# Patient Record
Sex: MALE | Race: OTHER | URBAN - METROPOLITAN AREA
[De-identification: names, ages, dates, MRNs, and addresses within clinical notes are randomized per-mention and may not be internally consistent; named-entity substitution may affect disease eponyms.]

---

## 2023-04-24 ENCOUNTER — OFFICE VISIT (OUTPATIENT)
Age: 48
End: 2023-04-24

## 2023-04-24 VITALS — HEIGHT: 72 IN | WEIGHT: 215 LBS | BODY MASS INDEX: 29.12 KG/M2

## 2023-04-24 DIAGNOSIS — M25.511 RIGHT SHOULDER PAIN, UNSPECIFIED CHRONICITY: Primary | ICD-10-CM

## 2023-04-24 RX ORDER — LISINOPRIL 20 MG/1
20 TABLET ORAL DAILY
COMMUNITY

## 2023-04-24 NOTE — PROGRESS NOTES
Name: Kulwinder Sim    : 1975     [facility] [address] [place] [address] AND SPORTS MEDICINE  [address] [place] [address] [address] [address]  Dept: [phone]  Dept Fax: [phone]     Chief Complaint   Patient presents with    Shoulder Pain        Ht 6' (1.829 m)   Wt 215 lb (97.5 kg)   BMI 29.16 kg/m²      No Known Allergies     Current Outpatient Medications   Medication Sig Dispense Refill    diclofenac sodium (VOLTAREN) 1 % GEL Apply topically      lisinopril (PRINIVIL;ZESTRIL) 20 MG tablet Take 1 tablet by mouth daily       No current facility-administered medications for this visit. There is no problem list on file for this patient. Family History   Problem Relation Age of Onset    No Known Problems Mother     No Known Problems Father       Social History     Socioeconomic History    Marital status:      Spouse name: None    Number of children: None    Years of education: None    Highest education level: None   Tobacco Use    Smoking status: Never    Smokeless tobacco: Never   Substance and Sexual Activity    Alcohol use: Never      Past Surgical History:   Procedure Laterality Date    BACK SURGERY        Past Medical History:   Diagnosis Date    Hypertension         I have reviewed and agree with [address] [place] [address] Nw and ROS and intake form in chart and the record furthermore I have reviewed prior medical record(s) regarding this patients care during this appointment. Review of Systems:   Patient is a pleasant appearing individual, appropriately dressed, well hydrated, well nourished, who is alert, appropriately oriented for age, and in no acute distress with a normal gait and normal affect who does not appear to be in any significant pain. Physical Exam:  Left Shoulder - Grossly neurovascularly intact. Range of motion-Full passive, Active with impingement.  Anterior apprehension, No Point tenderness, Strength-weakness with abduction, palpable

## 2023-04-24 NOTE — PATIENT INSTRUCTIONS
the chest to the neck, jaw, or one or both shoulders or arms. Dizziness or lightheadedness. A fast or uneven pulse. After calling 911, chew 1 adult-strength aspirin. Wait for an ambulance. Do not try to drive yourself. Your arm or hand is cool or pale or changes color. Call your doctor now or seek immediate medical care if:    You have signs of infection, such as: Increased pain, swelling, warmth, or redness in your shoulder. Red streaks leading from a place on your shoulder. Pus draining from an area of your shoulder. Swollen lymph nodes in your neck, armpits, or groin. A fever. Watch closely for changes in your health, and be sure to contact your doctor if:    You cannot use your shoulder. Your shoulder does not get better as expected. Where can you learn more? Go to http://www.woods.com/ and enter H996 to learn more about \"Shoulder Pain: Care Instructions. \"  Current as of: March 9, 2022               Content Version: 13.5  © 2006-2022 [employer]wise[employer]. Care instructions adapted under license by [facility] ([facility]). If you have questions about a medical condition or this instruction, always ask your healthcare professional. [place] [address] any warranty or liability for your use of this information.

## 2023-05-19 DIAGNOSIS — M25.511 RIGHT SHOULDER PAIN, UNSPECIFIED CHRONICITY: ICD-10-CM

## 2023-05-25 ENCOUNTER — HOSPITAL ENCOUNTER (OUTPATIENT)
Age: 48
End: 2023-05-25
Payer: COMMERCIAL

## 2023-05-25 ENCOUNTER — OFFICE VISIT (OUTPATIENT)
Age: 48
End: 2023-05-25

## 2023-05-25 ENCOUNTER — HOSPITAL ENCOUNTER (OUTPATIENT)
Age: 48
Discharge: HOME | End: 2023-05-25
Payer: COMMERCIAL

## 2023-05-25 DIAGNOSIS — S43.431A SUPERIOR GLENOID LABRUM LESION OF RIGHT SHOULDER, INITIAL ENCOUNTER: ICD-10-CM

## 2023-05-25 DIAGNOSIS — M25.511 RIGHT SHOULDER PAIN, UNSPECIFIED CHRONICITY: ICD-10-CM

## 2023-05-25 DIAGNOSIS — M75.111 INCOMPLETE ROTATOR CUFF TEAR OR RUPTURE OF RIGHT SHOULDER, NOT SPECIFIED AS TRAUMATIC: ICD-10-CM

## 2023-05-25 DIAGNOSIS — M25.511 RIGHT SHOULDER PAIN, UNSPECIFIED CHRONICITY: Primary | ICD-10-CM

## 2023-05-25 LAB
ANION GAP SERPL CALC-SCNC: 4 MMOL/L (ref 3–18)
BUN SERPL-MCNC: 20 MG/DL (ref 7–18)
BUN/CREAT SERPL: 28 (ref 12–20)
CA-I BLD-MCNC: 8.7 MG/DL (ref 8.5–10.1)
CHLORIDE SERPL-SCNC: 106 MMOL/L (ref 100–111)
CO2 SERPL-SCNC: 29 MMOL/L (ref 21–32)
CREAT SERPL-MCNC: 0.72 MG/DL (ref 0.6–1.3)
EKG ATRIAL RATE: 58 BPM
EKG DIAGNOSIS: NORMAL
EKG P AXIS: 46 DEGREES
EKG P-R INTERVAL: 160 MS
EKG Q-T INTERVAL: 400 MS
EKG QRS DURATION: 100 MS
EKG QTC CALCULATION (BAZETT): 392 MS
EKG R AXIS: 57 DEGREES
EKG T AXIS: 32 DEGREES
EKG VENTRICULAR RATE: 58 BPM
ERYTHROCYTE [DISTWIDTH] IN BLOOD BY AUTOMATED COUNT: 13.2 % (ref 11.6–14.5)
GLUCOSE SERPL-MCNC: 88 MG/DL (ref 74–99)
HCT VFR BLD AUTO: 43.1 % (ref 36–48)
HGB BLD-MCNC: 14.8 G/DL (ref 13–16)
MCH RBC QN AUTO: 30.9 PG (ref 24–34)
MCHC RBC AUTO-ENTMCNC: 34.3 G/DL (ref 31–37)
MCV RBC AUTO: 90 FL (ref 78–100)
NRBC # BLD: 0 K/UL (ref 0–0.01)
NRBC BLD-RTO: 0 PER 100 WBC
PLATELET # BLD AUTO: 186 K/UL (ref 135–420)
PMV BLD AUTO: 12.1 FL (ref 9.2–11.8)
POTASSIUM SERPL-SCNC: 3.6 MMOL/L (ref 3.5–5.5)
RBC # BLD AUTO: 4.79 M/UL (ref 4.35–5.65)
SODIUM SERPL-SCNC: 139 MMOL/L (ref 136–145)
WBC # BLD AUTO: 5.2 K/UL (ref 4.6–13.2)

## 2023-05-25 PROCEDURE — 36415 COLL VENOUS BLD VENIPUNCTURE: CPT

## 2023-05-25 PROCEDURE — 93005 ELECTROCARDIOGRAM TRACING: CPT

## 2023-05-25 PROCEDURE — 80048 BASIC METABOLIC PNL TOTAL CA: CPT

## 2023-05-25 PROCEDURE — 85027 COMPLETE CBC AUTOMATED: CPT

## 2023-06-01 RX ORDER — ACETAMINOPHEN 500 MG
1000 TABLET ORAL ONCE
Status: CANCELLED | OUTPATIENT
Start: 2023-06-01 | End: 2023-06-01

## 2023-06-01 RX ORDER — GABAPENTIN 300 MG/1
300 CAPSULE ORAL ONCE
Status: CANCELLED | OUTPATIENT
Start: 2023-06-01 | End: 2023-06-01

## 2023-06-12 NOTE — H&P (VIEW-ONLY)
utilize Tylenol or OTC NSAIDS as long as it is not medically contraindicated. Return to Office: Follow-up and Dispositions    Return for ALREADY SCHEDULED FOR SURGERY. Scribed by Amberly Andrade LPN as dictated by [facility] [place] RAJENDRA Witt MD.  Documentation, performed by, True and Accepted Milton Witt MD

## 2023-06-15 ENCOUNTER — HOSPITAL ENCOUNTER (OUTPATIENT)
Age: 48
Setting detail: OUTPATIENT SURGERY
Discharge: HOME | End: 2023-06-15
Attending: ORTHOPAEDIC SURGERY | Admitting: ORTHOPAEDIC SURGERY
Payer: COMMERCIAL

## 2023-06-15 VITALS
HEART RATE: 68 BPM | RESPIRATION RATE: 16 BRPM | HEIGHT: 72 IN | TEMPERATURE: 98.1 F | BODY MASS INDEX: 29.16 KG/M2 | DIASTOLIC BLOOD PRESSURE: 76 MMHG | OXYGEN SATURATION: 97 % | SYSTOLIC BLOOD PRESSURE: 127 MMHG

## 2023-06-15 PROCEDURE — 3600000002 HC SURGERY LEVEL 2 BASE: Performed by: ORTHOPAEDIC SURGERY

## 2023-06-15 PROCEDURE — 3700000001 HC ADD 15 MINUTES (ANESTHESIA): Performed by: ORTHOPAEDIC SURGERY

## 2023-06-15 PROCEDURE — 2709999900 HC NON-CHARGEABLE SUPPLY: Performed by: ORTHOPAEDIC SURGERY

## 2023-06-15 PROCEDURE — 7100000001 HC PACU RECOVERY - ADDTL 15 MIN: Performed by: ORTHOPAEDIC SURGERY

## 2023-06-15 PROCEDURE — 7100000011 HC PHASE II RECOVERY - ADDTL 15 MIN: Performed by: ORTHOPAEDIC SURGERY

## 2023-06-15 PROCEDURE — 7100000000 HC PACU RECOVERY - FIRST 15 MIN: Performed by: ORTHOPAEDIC SURGERY

## 2023-06-15 PROCEDURE — 3600000012 HC SURGERY LEVEL 2 ADDTL 15MIN: Performed by: ORTHOPAEDIC SURGERY

## 2023-06-15 PROCEDURE — 2720000010 HC SURG SUPPLY STERILE: Performed by: ORTHOPAEDIC SURGERY

## 2023-06-15 PROCEDURE — C1713 ANCHOR/SCREW BN/BN,TIS/BN: HCPCS | Performed by: ORTHOPAEDIC SURGERY

## 2023-06-15 PROCEDURE — 3700000000 HC ANESTHESIA ATTENDED CARE: Performed by: ORTHOPAEDIC SURGERY

## 2023-06-15 PROCEDURE — 6360000002 HC RX W HCPCS: Performed by: ORTHOPAEDIC SURGERY

## 2023-06-15 PROCEDURE — 2500000003 HC RX 250 WO HCPCS: Performed by: ORTHOPAEDIC SURGERY

## 2023-06-15 PROCEDURE — 7100000010 HC PHASE II RECOVERY - FIRST 15 MIN: Performed by: ORTHOPAEDIC SURGERY

## 2023-06-15 PROCEDURE — 2580000003 HC RX 258: Performed by: NURSE PRACTITIONER

## 2023-06-15 DEVICE — LUPINE BR ANCHOR W/DS ORTHOCORD TCP/PLGA ABSORBABLE ANCHOR, WITH DUAL SIZE 2 (5 METRIC) BRAIDED COMPOSITE SUTURE, VIOLET AND BLUE, 36 INCHES (91CM). SIZE 2 (5 METRIC) EYELET LOOP.
Type: IMPLANTABLE DEVICE | Site: SHOULDER | Status: FUNCTIONAL
Brand: LUPINE ORTHOCORD

## 2023-06-15 RX ORDER — OXYCODONE HYDROCHLORIDE AND ACETAMINOPHEN 5; 325 MG/1; MG/1
1 TABLET ORAL EVERY 4 HOURS PRN
Status: DISCONTINUED | OUTPATIENT
Start: 2023-06-15 | End: 2023-06-15 | Stop reason: HOSPADM

## 2023-06-15 RX ORDER — SODIUM CHLORIDE, SODIUM LACTATE, POTASSIUM CHLORIDE, CALCIUM CHLORIDE 600; 310; 30; 20 MG/100ML; MG/100ML; MG/100ML; MG/100ML
INJECTION, SOLUTION INTRAVENOUS CONTINUOUS
Status: DISCONTINUED | OUTPATIENT
Start: 2023-06-15 | End: 2023-06-15 | Stop reason: HOSPADM

## 2023-06-15 RX ORDER — SODIUM CHLORIDE 0.9 % (FLUSH) 0.9 %
5-40 SYRINGE (ML) INJECTION EVERY 12 HOURS SCHEDULED
Status: DISCONTINUED | OUTPATIENT
Start: 2023-06-15 | End: 2023-06-15 | Stop reason: HOSPADM

## 2023-06-15 RX ORDER — SODIUM CHLORIDE 9 MG/ML
INJECTION, SOLUTION INTRAVENOUS PRN
Status: DISCONTINUED | OUTPATIENT
Start: 2023-06-15 | End: 2023-06-15 | Stop reason: HOSPADM

## 2023-06-15 RX ORDER — ONDANSETRON 2 MG/ML
4 INJECTION INTRAMUSCULAR; INTRAVENOUS
Status: DISCONTINUED | OUTPATIENT
Start: 2023-06-15 | End: 2023-06-15 | Stop reason: HOSPADM

## 2023-06-15 RX ORDER — FENTANYL CITRATE 50 UG/ML
50 INJECTION, SOLUTION INTRAMUSCULAR; INTRAVENOUS EVERY 5 MIN PRN
Status: DISCONTINUED | OUTPATIENT
Start: 2023-06-15 | End: 2023-06-15 | Stop reason: HOSPADM

## 2023-06-15 RX ORDER — CELECOXIB 200 MG/1
400 CAPSULE ORAL ONCE
Status: DISCONTINUED | OUTPATIENT
Start: 2023-06-15 | End: 2023-06-15

## 2023-06-15 RX ORDER — BUPIVACAINE HYDROCHLORIDE AND EPINEPHRINE 2.5; 5 MG/ML; UG/ML
INJECTION, SOLUTION INFILTRATION; PERINEURAL PRN
Status: DISCONTINUED | OUTPATIENT
Start: 2023-06-15 | End: 2023-06-15 | Stop reason: ALTCHOICE

## 2023-06-15 RX ORDER — SODIUM CHLORIDE 0.9 % (FLUSH) 0.9 %
5-40 SYRINGE (ML) INJECTION PRN
Status: DISCONTINUED | OUTPATIENT
Start: 2023-06-15 | End: 2023-06-15 | Stop reason: HOSPADM

## 2023-06-15 RX ADMIN — SODIUM CHLORIDE, POTASSIUM CHLORIDE, SODIUM LACTATE AND CALCIUM CHLORIDE: 600; 310; 30; 20 INJECTION, SOLUTION INTRAVENOUS at 08:49

## 2023-06-15 ASSESSMENT — PAIN SCALES - GENERAL
PAINLEVEL_OUTOF10: 0

## 2023-06-15 ASSESSMENT — PAIN - FUNCTIONAL ASSESSMENT: PAIN_FUNCTIONAL_ASSESSMENT: NONE - DENIES PAIN

## 2023-06-15 ASSESSMENT — PAIN SCALES - WONG BAKER
WONGBAKER_NUMERICALRESPONSE: 0

## 2023-06-15 NOTE — INTERVAL H&P NOTE
Update History & Physical    The Patient's History and Physical was reviewed with the patient. The patient was examined. There was no change. The surgical site was confirmed by the patient and me. Patient understands and wants to proceed with the procedure. If applicable, I have discussed with the patient / power of  the rationale for blood component transfusion; its benefits in treating or preventing fatigue, organ damage, or death; and its risk which includes mild transfusion reactions, rare risk of blood borne infection, or more serious but rare reactions. I have discussed the alternatives to transfusion, including the risk and consequences of not receiving transfusion. The patient / [place] of  had an opportunity to ask questions and had agreed to proceed with transfusion of blood components. Plan:  The risk, benefits, expected outcome, and alternative to the recommended procedure have been discussed with the patient.

## 2023-06-15 NOTE — DISCHARGE INSTRUCTIONS
Arthroscopic Surgery of the Shoulder #2 Post-op    1. Your first home meal should be clear liquids. 2.  An ice pack should be applied to the shoulder for at least 20 minutes 4 times a day or more often in the first 72 hours. Do not use heat. This may increase swelling and discomfort. 3.  Exercises are not necessary at this stage. You will be instructed on exercises during your visit to the office next week. 4.  A shoulder immobilizer is provided for healing. You may remove it for showers. Keep arm in the same position as if in the immobilizer. Wear immobilizer at all other times, including while asleep. 5.  Your shoulder will be immobilized until your follow up appt. 6.  A prescription will be provided for pain before you are discharged. Do not take any aspirin for 1 week after surgery. Use plain Tylenol instead. Please inform the office of any known drug allergy. 7.  The dressing may be removed after 48 hours. Cover the incision with Band-Aids. 8.  You may shower 48 hours after surgery. Carefully dry the incisions and change Band-Aids daily after shower. 9.  You are not cleared to drive while your shoulder is immobilized. 10.  Activities:  A. let pain be her guide to activity, too much pain equals too much activity  B. Some amount of swelling may be present postoperatively. Restrict activity if swelling is present. C.  Do not use exercise machines unless specifically instructed to. D.  Generally, if you have a job with little physical activity may return to work on the third postoperative day. E.  If your job requires excessive lifting or the use of your arm then discuss her return to work date with your provider. If you need refill of pain medication, call the office 2 business days prior to running out of medication. Please call during regular business hours, Medication refill requests will not be addressed during non-business hours.  Please do not page the

## 2023-06-15 NOTE — OP NOTE
Operative Note    Patient: Allan Garnica MRN: [id number]  Surgery Date: [unfilled]  [unfilled]          Procedure  Primary Surgeon    RIGHT SHOULDER ARTHROSCOPY/ D/ M/ SSB/ POSS RTC REP/ EXT AMOS/ POSS SLAP REP  Jeanna Cantu MD    * Panel 2 does not exist *  * Panel 2 does not exist *    * Panel 3 does not exist *  * Panel 3 does not exist *     Surgeon(s) and Role:     * Jeanna Cantu MD - Primary    Other OR Staff/Assistants:  Circulator: Milagros Luke RN  Surgical Assistant: Karen Solorio  Scrub Person First: Wilner De Anda RN  Charge Nurse: Johnathan Garcia RN    1st Assistant Tasks:  Closing    Pre-operative Diagnosis:  Complete tear of right rotator cuff, unspecified whether traumatic [M75.121]  Articular cartilage disorder of shoulder, 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]  Arthritis of right shoulder region [M19.011]  Acute pain of right shoulder [M25.511]  Superior glenoid labrum lesion of left shoulder, subsequent encounter [S43.517D]    Post-operative Diagnosis: same as preop diagnosis    Anesthesia Type: General     Findings: Labral tear    Complications: No    EBL: 25CC    Implants:   Implants       Type Not Specified    [employer] Sut For Arthscp Instability Rep Rue Covert - [id number] - Implanted   (Right) Shoulder      Inventory item: ANCHOR SUT FOR ARTHSCP INSTABILITY  Doron Hummel Model/Cat number: [id number]    : [employer] Lot number: [id number]    Device identifier: [id number] Device identifier type: GS1      GUDID Information       Request status Successful        Brand name: Ally Doll Version/Model: [address] [place] [address] Street name: [facility] MRI safety info as of 6/15/23: Labeling does not contain MRI Safety Information    Contains dry or latex rubber: No      GMDN P.T. name: Tendon/ligament bone anchor, bioabsorbable                As of 6/15/2023       Status: Implanted                              Specimens: None    Operative procedure: Labral repair, decompression,

## 2023-06-22 ENCOUNTER — TELEPHONE (OUTPATIENT)
Age: 48
End: 2023-06-22

## 2023-06-22 DIAGNOSIS — Z98.890 S/P RIGHT ROTATOR CUFF REPAIR: Primary | ICD-10-CM

## 2023-06-22 RX ORDER — OXYCODONE HYDROCHLORIDE AND ACETAMINOPHEN 5; 325 MG/1; MG/1
1 TABLET ORAL EVERY 6 HOURS PRN
Qty: 30 TABLET | Refills: 0 | Status: SHIPPED | OUTPATIENT
Start: 2023-06-22 | End: 2023-06-30

## 2023-06-22 NOTE — TELEPHONE ENCOUNTER
Patient had a rt shoulder scope on 6/15/23     He is requesting a refill on his [address], [place]

## 2023-06-29 ENCOUNTER — OFFICE VISIT (OUTPATIENT)
Age: 48
End: 2023-06-29

## 2023-06-29 VITALS — HEIGHT: 72 IN | BODY MASS INDEX: 29.12 KG/M2 | WEIGHT: 215 LBS

## 2023-06-29 DIAGNOSIS — Z98.890 STATUS POST LABRAL REPAIR OF SHOULDER: Primary | ICD-10-CM

## 2023-06-29 DIAGNOSIS — Z98.890 HISTORY OF ARTHROSCOPIC SURGERY OF SHOULDER: ICD-10-CM

## 2023-06-29 PROCEDURE — 99024 POSTOP FOLLOW-UP VISIT: CPT | Performed by: NURSE PRACTITIONER

## 2023-06-29 RX ORDER — OXYCODONE HYDROCHLORIDE AND ACETAMINOPHEN 5; 325 MG/1; MG/1
1 TABLET ORAL EVERY 6 HOURS PRN
Qty: 30 TABLET | Refills: 0 | Status: SHIPPED | OUTPATIENT
Start: 2023-06-29 | End: 2023-07-07

## 2023-07-10 ENCOUNTER — TELEPHONE (OUTPATIENT)
Age: 48
End: 2023-07-10

## 2023-07-10 DIAGNOSIS — G89.18 ACUTE POSTOPERATIVE PAIN: Primary | ICD-10-CM

## 2023-07-10 RX ORDER — OXYCODONE HYDROCHLORIDE AND ACETAMINOPHEN 5; 325 MG/1; MG/1
1 TABLET ORAL EVERY 8 HOURS PRN
Qty: 21 TABLET | Refills: 0 | Status: SHIPPED | OUTPATIENT
Start: 2023-07-10 | End: 2023-07-17

## 2023-08-03 ENCOUNTER — OFFICE VISIT (OUTPATIENT)
Age: 48
End: 2023-08-03

## 2023-08-03 DIAGNOSIS — Z98.890 STATUS POST LABRAL REPAIR OF SHOULDER: Primary | ICD-10-CM

## 2023-08-03 NOTE — PROGRESS NOTES
Name: Musa Davis    : 1975     [facility] AND SPORTS MEDICINE  [address] [place] [address], [address] [place] [address] [address] [address]  Dept: [phone]  Dept Fax: [phone]     Chief Complaint   Patient presents with    Shoulder Pain    Post-Op Check        There were no vitals taken for this visit. No Known Allergies     Current Outpatient Medications   Medication Sig Dispense Refill    diclofenac sodium (VOLTAREN) 1 % GEL Apply topically      lisinopril (PRINIVIL;ZESTRIL) 20 MG tablet Take 1 tablet by mouth daily       No current facility-administered medications for this visit. There is no problem list on file for this patient. Family History   Problem Relation Age of Onset    No Known Problems Mother     No Known Problems Father       Social History     Socioeconomic History    Marital status:      Spouse name: None    Number of children: None    Years of education: None    Highest education level: None   Tobacco Use    Smoking status: Never    Smokeless tobacco: Never   Substance and Sexual Activity    Alcohol use: Never      Past Surgical History:   Procedure Laterality Date    BACK SURGERY      SHOULDER ARTHROSCOPY Right 6/15/2023    RIGHT SHOULDER ARTHROSCOPY/ Decompression/ Yonis/ SSB/ EXT AMOS/ SLAP REP performed by Miladys Rosas MD at [facility] MAIN OR      Past Medical History:   Diagnosis Date    Hypertension         I have reviewed and agree with [address] and ROS and intake form in chart and the record furthermore I have reviewed prior medical record(s) regarding this patients care during this appointment. Review of Systems:   Patient is a pleasant appearing individual, appropriately dressed, well hydrated, well nourished, who is alert, appropriately oriented for age, and in no acute distress with a normal gait and normal affect who does not appear to be in any significant pain.     Physical Exam:  Right Shoulder - Grossly

## 2023-08-31 ENCOUNTER — OFFICE VISIT (OUTPATIENT)
Age: 48
End: 2023-08-31

## 2023-08-31 VITALS — HEIGHT: 72 IN | BODY MASS INDEX: 29.12 KG/M2 | WEIGHT: 215 LBS

## 2023-08-31 DIAGNOSIS — Z98.890 STATUS POST LABRAL REPAIR OF SHOULDER: Primary | ICD-10-CM

## 2023-08-31 PROCEDURE — 99024 POSTOP FOLLOW-UP VISIT: CPT

## 2023-08-31 RX ORDER — GABAPENTIN 100 MG/1
100 CAPSULE ORAL 3 TIMES DAILY
Qty: 90 CAPSULE | Refills: 0 | Status: SHIPPED | OUTPATIENT
Start: 2023-08-31 | End: 2023-09-30

## 2023-09-27 ENCOUNTER — OFFICE VISIT (OUTPATIENT)
Age: 48
End: 2023-09-27

## 2023-09-27 DIAGNOSIS — Z98.890 STATUS POST LABRAL REPAIR OF SHOULDER: Primary | ICD-10-CM

## 2023-09-27 PROCEDURE — 99024 POSTOP FOLLOW-UP VISIT: CPT

## 2023-09-27 RX ORDER — METHYLPREDNISOLONE 4 MG/1
TABLET ORAL
Qty: 1 KIT | Refills: 0 | Status: SHIPPED | OUTPATIENT
Start: 2023-09-27

## 2023-09-27 RX ORDER — TRAMADOL HYDROCHLORIDE 50 MG/1
50 TABLET ORAL EVERY 6 HOURS PRN
Qty: 30 TABLET | Refills: 0 | Status: SHIPPED | OUTPATIENT
Start: 2023-09-27 | End: 2023-10-05

## 2023-10-23 ENCOUNTER — PATIENT MESSAGE (OUTPATIENT)
Age: 48
End: 2023-10-23

## 2023-10-23 RX ORDER — METHYLPREDNISOLONE 4 MG/1
TABLET ORAL
Qty: 1 KIT | Refills: 0 | Status: SHIPPED | OUTPATIENT
Start: 2023-10-23

## 2023-10-23 NOTE — TELEPHONE ENCOUNTER
From: Kwadwo Kelsey  To: Christie Newberry Hash  Sent: 10/23/2023 9:44 AM EDT  Subject: Methylprednisolone    Good morning, I need a methylprednisolone refill, could you help me with that.

## 2023-11-09 ENCOUNTER — OFFICE VISIT (OUTPATIENT)
Age: 48
End: 2023-11-09

## 2023-11-09 DIAGNOSIS — M25.511 RIGHT SHOULDER PAIN, UNSPECIFIED CHRONICITY: Primary | ICD-10-CM

## 2023-11-09 PROCEDURE — 99212 OFFICE O/P EST SF 10 MIN: CPT | Performed by: ORTHOPAEDIC SURGERY

## 2023-11-09 NOTE — PROGRESS NOTES
instabilty is noted, No apprehension. No Swelling. Left Shoulder - Grossly neurovascularly intact, Full Range of motion, No point tenderness, No weakness, No skin lesions, No Instability, No apprehension, No swelling. Visit Diagnoses         Codes    Right shoulder pain, unspecified chronicity    -  Primary M25.511               HPI:  The patient is here status post right shoulder arthroscopy, decompression Yonis and SLAP repair, doing well. Pain is 5/10. Assessment/Plan:  Plan at this point, my recommendation will be for ice, elevate, antiinflammatories. He is going to go to stage III right shoulder. His surgery was on 06/15/2023 and we will see him back after FCE and we will go from there. As part of continued conservative pain management options the patient was advised to utilize Tylenol or OTC NSAIDS as long as it is not medically contraindicated. Return to Office: Follow-up and Dispositions    Return if symptoms worsen or fail to improve. Scribed by Yesenia Whiting LPN as dictated by [facility] [place] RAJENDRA Matthews MD.  Documentation, performed by, True and Accepted Milton Matthews MD

## 2024-02-26 ENCOUNTER — OFFICE VISIT (OUTPATIENT)
Age: 49
End: 2024-02-26

## 2024-02-26 DIAGNOSIS — M25.511 RIGHT SHOULDER PAIN, UNSPECIFIED CHRONICITY: Primary | ICD-10-CM

## 2024-02-26 NOTE — PROGRESS NOTES
Name: Gutierrez Rojo    : 1975     [facility] ORTHOPAEDICS AND SPORTS MEDICINE  [address], [address]  [facility] [address]  Dept: [phone]  Dept Fax: [phone]     Chief Complaint   Patient presents with    Shoulder Pain        There were no vitals taken for this visit.     No Known Allergies     Current Outpatient Medications   Medication Sig Dispense Refill    methylPREDNISolone (MEDROL DOSEPACK) 4 MG tablet Take by mouth Per Dose pack instructions 1 kit 0    gabapentin (NEURONTIN) 100 MG capsule Take 1 capsule by mouth 3 times daily for 30 days. Intended supply: 30 days Max Daily Amount: 300 mg 90 capsule 0     No current facility-administered medications for this visit.      There is no problem list on file for this patient.     Family History   Problem Relation Age of Onset    No Known Problems Mother     No Known Problems Father        Past Surgical History:   Procedure Laterality Date    BACK SURGERY      SHOULDER ARTHROSCOPY Right 6/15/2023    RIGHT SHOULDER ARTHROSCOPY/ Decompression/ Yonis/ SSB/ EXT AMOS/ SLAP REP performed by Milton Motley MD at [facility] MAIN OR      Past Medical History:   Diagnosis Date    Hypertension         I have reviewed and agree with PFSH and ROS and intake form in chart and the record furthermore I have reviewed prior medical record(s) regarding this patients care during this appointment.     Review of Systems:   Patient is a pleasant appearing individual, appropriately dressed, well hydrated, well nourished, who is alert, appropriately oriented for age, and in no acute distress with a normal gait and normal affect who does not appear to be in any significant pain.     Physical Exam:  Right Shoulder - Grossly neurovascularly intact. Range of motion-Full passive, Active with impingement, Anterior apprehension, No Point tenderness, Strength-weakness with abduction, palpable popping felt on exam, No skin

## 2024-02-26 NOTE — PATIENT INSTRUCTIONS
the chest to the neck, jaw, or one or both shoulders or arms.  Dizziness or lightheadedness.  A fast or uneven pulse.  After calling 911, chew 1 adult-strength aspirin. Wait for an ambulance. Do not try to drive yourself.     Your arm or hand is cool or pale or changes color.   Call your doctor now or seek immediate medical care if:    You have signs of infection, such as:  Increased pain, swelling, warmth, or redness in your shoulder.  Red streaks leading from a place on your shoulder.  Pus draining from an area of your shoulder.  Swollen lymph nodes in your neck, armpits, or groin.  A fever.   Watch closely for changes in your health, and be sure to contact your doctor if:    You cannot use your shoulder.     Your shoulder does not get better as expected.   Where can you learn more?  Go to https://www.[employer].net/patientEd and enter H996 to learn more about \"Shoulder Pain: Care Instructions.\"  Current as of: March 9, 2022               Content Version: 13.5  © 2006-2022 [employer].   Care instructions adapted under license by [employer]. If you have questions about a medical condition or this instruction, always ask your healthcare professional. [employer] disclaims any warranty or liability for your use of this information.

## 2024-06-04 DIAGNOSIS — M25.511 RIGHT SHOULDER PAIN, UNSPECIFIED CHRONICITY: ICD-10-CM

## 2024-06-13 ENCOUNTER — OFFICE VISIT (OUTPATIENT)
Age: 49
End: 2024-06-13

## 2024-06-13 DIAGNOSIS — M25.511 RIGHT SHOULDER PAIN, UNSPECIFIED CHRONICITY: Primary | ICD-10-CM

## 2024-06-13 NOTE — PROGRESS NOTES
Name: Gutierrez Lopez    : 1975     [facility] ORTHOPAEDICS AND SPORTS MEDICINE  [address], [address]  [facility] [address]  Dept: [phone]  Dept Fax: [phone]     Chief Complaint   Patient presents with    Shoulder Pain     Right - post MRI        There were no vitals taken for this visit.     No Known Allergies     Current Outpatient Medications   Medication Sig Dispense Refill    finasteride (PROSCAR) 5 MG tablet Take 1 tablet by mouth daily 90 tablet 0    lisinopril (PRINIVIL;ZESTRIL) 5 MG tablet Take 1 tablet by mouth daily      tamsulosin (FLOMAX) 0.4 MG capsule Take 1 capsule by mouth daily 90 capsule 1    methylPREDNISolone (MEDROL DOSEPACK) 4 MG tablet Take by mouth Per Dose pack instructions (Patient not taking: Reported on 2024) 1 kit 0    gabapentin (NEURONTIN) 100 MG capsule Take 1 capsule by mouth 3 times daily for 30 days. Intended supply: 30 days Max Daily Amount: 300 mg 90 capsule 0     No current facility-administered medications for this visit.      There is no problem list on file for this patient.     Family History   Problem Relation Age of Onset    No Known Problems Mother     No Known Problems Father        Past Surgical History:   Procedure Laterality Date    BACK SURGERY      SHOULDER ARTHROSCOPY Right 6/15/2023    RIGHT SHOULDER ARTHROSCOPY/ Decompression/ Yonis/ SSB/ EXT AMOS/ SLAP REP performed by Milton Motley MD at [facility] MAIN OR      Past Medical History:   Diagnosis Date    Hypertension         I have reviewed and agree with [facility] and intake form in chart and the record furthermore I have reviewed prior medical record(s) regarding this patients care during this appointment.     Review of Systems:   Patient is a pleasant appearing individual, appropriately dressed, well hydrated, well nourished, who is alert, appropriately oriented for age, and in no acute distress with a normal gait and

## 2024-07-12 ENCOUNTER — PATIENT MESSAGE (OUTPATIENT)
Age: 49
End: 2024-07-12

## 2024-07-12 DIAGNOSIS — M25.511 RIGHT SHOULDER PAIN, UNSPECIFIED CHRONICITY: Primary | ICD-10-CM

## 2024-07-12 DIAGNOSIS — Z98.890 STATUS POST LABRAL REPAIR OF SHOULDER: ICD-10-CM

## 2024-07-12 DIAGNOSIS — Z98.890 HISTORY OF ARTHROSCOPIC SURGERY OF SHOULDER: ICD-10-CM

## 2024-07-12 DIAGNOSIS — Z98.890 S/P RIGHT ROTATOR CUFF REPAIR: ICD-10-CM

## 2024-07-18 ENCOUNTER — OFFICE VISIT (OUTPATIENT)
Age: 49
End: 2024-07-18

## 2024-07-18 DIAGNOSIS — M25.511 RIGHT SHOULDER PAIN, UNSPECIFIED CHRONICITY: Primary | ICD-10-CM

## 2024-07-18 NOTE — PROGRESS NOTES
Name: Gutierrez Lopez    : 1975     [facility] ORTHOPAEDICS AND SPORTS MEDICINE  [address], [address]  [facility] [address]  Dept: [phone]  Dept Fax: [phone]     Chief Complaint   Patient presents with    Shoulder Pain        There were no vitals taken for this visit.     No Known Allergies     Current Outpatient Medications   Medication Sig Dispense Refill    finasteride (PROSCAR) 5 MG tablet Take 1 tablet by mouth daily 90 tablet 0    lisinopril (PRINIVIL;ZESTRIL) 5 MG tablet Take 1 tablet by mouth daily      tamsulosin (FLOMAX) 0.4 MG capsule Take 1 capsule by mouth daily 90 capsule 1    methylPREDNISolone (MEDROL DOSEPACK) 4 MG tablet Take by mouth Per Dose pack instructions (Patient not taking: Reported on 2024) 1 kit 0    gabapentin (NEURONTIN) 100 MG capsule Take 1 capsule by mouth 3 times daily for 30 days. Intended supply: 30 days Max Daily Amount: 300 mg 90 capsule 0     No current facility-administered medications for this visit.       There is no problem list on file for this patient.     Family History   Problem Relation Age of Onset    No Known Problems Mother     No Known Problems Father        Past Surgical History:   Procedure Laterality Date    BACK SURGERY      SHOULDER ARTHROSCOPY Right 6/15/2023    RIGHT SHOULDER ARTHROSCOPY/ Decompression/ Yonis/ SSB/ EXT AMOS/ SLAP REP performed by Milton Motley MD at [facility] MAIN OR      Past Medical History:   Diagnosis Date    Hypertension     Kidney stone         I have reviewed and agree with [facility] and intake form in chart and the record furthermore I have reviewed prior medical record(s) regarding this patients care during this appointment.     Review of Systems:   Patient is a pleasant appearing individual, appropriately dressed, well hydrated, well nourished, who is alert, appropriately oriented for age, and in no acute distress with a normal gait and

## (undated) DEVICE — COUNTER NDL 40 COUNT HLD 70 FOAM BLK ADH W/ MAG

## (undated) DEVICE — GOWN,AURORA,FABRIC-REINFORCED,X-LARGE: Brand: MEDLINE

## (undated) DEVICE — SOLUTION IRRIG 3000ML LAC RINGERS ARTHROMTC PLAS CONT

## (undated) DEVICE — DBD-CANISTER,SUCTION,JUMBO JUG,12L,6EA/C: Brand: MEDLINE

## (undated) DEVICE — GLOVE SURG 7 BIOGEL PI ULTRATOUCH G

## (undated) DEVICE — DRESSING,GAUZE,XEROFORM,CURAD,1"X8",ST: Brand: CURAD

## (undated) DEVICE — GLOVE ORANGE PI 7   MSG9070

## (undated) DEVICE — NEEDLE SPNL L3.5IN PNK HUB S STL REG WALL FIT STYL W/ QNCKE

## (undated) DEVICE — GLOVE SURG SZ 7 L12IN FNGR THK79MIL GRN LTX FREE

## (undated) DEVICE — 3M™ IOBAN™ 2 ANTIMICROBIAL INCISE DRAPE 6650EZ: Brand: IOBAN™ 2

## (undated) DEVICE — PAD,ABDOMINAL,5"X9",STERILE,LF,1/PK: Brand: MEDLINE INDUSTRIES, INC.

## (undated) DEVICE — SYRINGE MED 30ML STD CLR PLAS LUERLOCK TIP N CTRL DISP

## (undated) DEVICE — CLEAR-TRAC THREADED CANNULA WITH                                    OBTURATOR 8 MM I.D. X 76 MM,                                    STERILE, LATEX FREE, BOX OF 10: Brand: CLEAR-TRAC

## (undated) DEVICE — T-MAX DISPOSABLE FACE MASK 8 PER BOX

## (undated) DEVICE — GRASPER SUT 60DEG SHRP TIP LO PROF FOR RAP ACCS IN SHLDR

## (undated) DEVICE — COVER,TABLE,HEAVY DUTY,77"X90",STRL: Brand: MEDLINE

## (undated) DEVICE — TAPE ADH W2INXL10YD WHT PAPR GENTLE BRTH FLX COMFORTABLE

## (undated) DEVICE — TOWEL,OR,DSP,ST,BLUE,STD,4/PK,20PK/CS: Brand: MEDLINE

## (undated) DEVICE — GLOVE SURG UNDERGLOVE 7.5 PF BLU BIOGEL PI MIC LF

## (undated) DEVICE — SYSTEM LBL CORRECT MED 2 MED

## (undated) DEVICE — APPLICATOR MEDICATED 26 CC SOLUTION CLR STRL CHLORAPREP

## (undated) DEVICE — TOWEL SURG SM W12XL18IN CLR PLAS TEAR RESIST REINF ADH FRST

## (undated) DEVICE — BASIC SINGLE BASIN-LF: Brand: MEDLINE INDUSTRIES, INC.

## (undated) DEVICE — GOWN,ECLIPSE,NONRNF,XL,ST,30/CS: Brand: MEDLINE

## (undated) DEVICE — PACK WRAP GEL ACCU-THERM

## (undated) DEVICE — 4-PORT MANIFOLD: Brand: NEPTUNE 2

## (undated) DEVICE — COVER,MAYO STAND,STERILE: Brand: MEDLINE

## (undated) DEVICE — GAUZE,SPONGE,4"X4",16PLY,XRAY,STRL,LF: Brand: MEDLINE

## (undated) DEVICE — THERAPEUTIC PACK HOT CLD GEL 12.9X5.8 IN ACCU-THERM REUSE

## (undated) DEVICE — DRAPE SHLDR 172X100IN STRL

## (undated) DEVICE — GARMENT COMPR M FOR 13IN FT INTMIT SGL BLDR HEM FORC II

## (undated) DEVICE — 4.5 FULL RADIUS BONECUTTER BLADES,                                    YELLOW, 8000 MAXIMUM RPM, PACKAGED 6                                    PER BOX, STERILE

## (undated) DEVICE — GLOVE SURG SZ 65 THK91MIL LTX FREE SYN POLYISOPRENE

## (undated) DEVICE — TUBING, SUCTION, 9/32" X 10', STRAIGHT: Brand: MEDLINE

## (undated) DEVICE — 4.5 MM HELICUT STRAIGHT BURRS,                                    POWER/EP-1, SLATE, 5000 MAXIMUM RPM,                                    PACKAGED 6 PER BOX, STERILE: Brand: DYONICS HELICUT

## (undated) DEVICE — INTENDED FOR TISSUE SEPARATION, AND OTHER PROCEDURES THAT REQUIRE A SHARP SURGICAL BLADE TO PUNCTURE OR CUT.: Brand: BARD-PARKER ® STAINLESS STEEL BLADES

## (undated) DEVICE — SHEET,DRAPE,70X100,STERILE: Brand: MEDLINE

## (undated) DEVICE — CLEAR-TRAC THREADED CANNULA WITH                                    OBTURATOR 5 MM X 76 MM, LATEX FREE,                                    BOX OF 10: Brand: CLEAR-TRAC

## (undated) DEVICE — GAUZE,SPONGE,4"X4",16PLY,STRL,LF,10/TRAY: Brand: MEDLINE

## (undated) DEVICE — INFLOW CASSETTE TUBING, DO NOT USE IF PACKAGE IS DAMAGED: Brand: CROSSFLOW

## (undated) DEVICE — SUTURE ETHLN SZ 2-0 L18IN NONABSORBABLE BLK L26MM FS 3/8 664G

## (undated) DEVICE — BANDAGE COBAN 6 IN WND 6INX5YD FOAM

## (undated) DEVICE — STOCKINETTE ORTHOPEDIC IMPERV 36X9 IN STRL